# Patient Record
(demographics unavailable — no encounter records)

---

## 2017-01-01 NOTE — DSES
DATE OF BIRTH/DATE OF ADMISSION:  2017

DATE OF DISCHARGE:  2017

 

DIAGNOSES:

1.  Term male .

2.  Tongue-tied / ankyloglossia.

 

PROCEDURES DURING HOSPITALIZATION:

1.  Circumcision performed 2017 by Dr. Patel.

2.  Frenectomy performed 2017 by Dr. Santana.

3.  Hearing screen.

4.  BiliChek.

 

HISTORY:  This child is a term male  who was delivered by spontaneous

vaginal delivery at [facility] on the afternoon of 2017.

Mother is 19 years old,  1, now para 1.  Her blood type is O negative.

Her group B strep screen was negative.  Her hepatitis B surface antigen, VDRL and

HIV status were all negative.  Rupture of membranes occurred approximately 3

hours prior to delivery with meconium-stained amniotic fluid.  The child was

given Apgar scores of eight at 1 minute and nine at 5 minutes.  He did not

require tracheal suctioning.  Birthweight 3650 grams which is 8 pounds 1 ounce,

head circumference 13 and three-quarter inches, length 20 and one-quarter inches.

 physical examination was normal with some normal Mexican spots noted on

the buttocks and some small dark nevi on the right flank and lower back.  The

child was given his initial hepatitis B vaccination on his day of delivery.

Mother's blood type is O negative.  The baby is B+.  Both the direct and indirect

Jamia tests were negative.  Dr. Patel circumcised the child on 2017.  The

child had a tight thick lingual frenulum.  Mother requested a frenectomy to help

prevent future speech problems.  I discussed the procedure with her and she gave

informed consent for this procedure to be done.  I performed the frenectomy on

2017, by compressing the lingual frenulum with a hemostat and then cutting

it with scissors.  The procedure was uncomplicated and well tolerated, the result

was good and there was minimal blood loss of less than 0.3 mL.  I reexamined the

child about 30 minutes after the frenectomy had been completed.  The frenectomy

site was healing well with no bleeding.  The child was discharged to home in good

condition to his mother's care at that time.  His weight on the day of discharge

was 3384 grams which is 7 pounds 7 ounces.  He was active and responsive.  He had

no clinical jaundice with a BiliChek of 8.6 and he was breastfeeding well.  His

circumcision was healing well.  I showed his parents how to apply Vaseline with

each diaper change for 3 days.  The child passed a hearing screen prior to

discharge.  His followup is going to be at the [facility] at [place].  His

mother has the [facility] contact number to call to schedule that appointment.

 

Guarantor's insurance number is .

## 2017-01-01 NOTE — RO
DATE OF PROCEDURE:  2017

 

PREPROCEDURE DIAGNOSIS:  Circumcision.

 

POSTPROCEDURE DIAGNOSIS:  Circumcision.

 

OPERATION PROPOSED:  Circumcision.

 

OPERATION PERFORMED:  Circumcision.

 

SURGEON:  Dr. Ankush Patel

 

ASSISTANT:

 

ANESTHESIA:  Penile block 1% Xylocaine 5 mL.

 

ESTIMATED BLOOD LOSS:  Less than 1 mL.

 

DESCRIPTION OF PROCEDURE:  After adequate time-out, penile block 1% Xylocaine 5

mL, circumcision was performed with a 1.3 Gomco bell. Hemostasis was secured.

Vaseline was applied to the penis and diaper, and the patient was taken back to

the mother with discharge instructions.

 

Copy To:  [facility]